# Patient Record
Sex: MALE | Race: WHITE
[De-identification: names, ages, dates, MRNs, and addresses within clinical notes are randomized per-mention and may not be internally consistent; named-entity substitution may affect disease eponyms.]

---

## 2017-01-10 ENCOUNTER — HOSPITAL ENCOUNTER (OUTPATIENT)
Dept: HOSPITAL 61 - KCIC | Age: 57
Discharge: HOME | End: 2017-01-10
Attending: NEUROLOGICAL SURGERY
Payer: OTHER GOVERNMENT

## 2017-01-10 DIAGNOSIS — M51.37: ICD-10-CM

## 2017-01-10 DIAGNOSIS — M47.896: ICD-10-CM

## 2017-01-10 DIAGNOSIS — M54.16: Primary | ICD-10-CM

## 2017-01-10 PROCEDURE — 72110 X-RAY EXAM L-2 SPINE 4/>VWS: CPT

## 2017-01-10 PROCEDURE — 72132 CT LUMBAR SPINE W/DYE: CPT

## 2017-01-10 PROCEDURE — 72265 MYELOGRAPHY L-S SPINE: CPT

## 2017-01-10 NOTE — [facility]
PROCEDURE 

CT lumbar spine exam 

 

HISTORY 

Lumbar radiculopathy, low back pain 

 

TECHNIQUE 

CT imaging was performed of the lumbar spine after injection for the 

lumbar myelogram. Multiplanar reconstruction images are submitted. 

Exposure: One or more of the following individualized dose reduction 

techniques were utilized for this exam: 1. Automated exposure control. 2. 

Adjustment of the mA and/or kV according to patient size. 3. Use of 

iterative reconstruction technique. 

 

COMPARISON 

MRI lumbar spine exam August 21, 2016 [facility] 

 

FINDINGS 

There is transitional anatomy of the lumbar spine. Based on the 

visualization of bilateral ribs at what will be considered T12, there is a

rudimentary intervertebral disc space at what is considered S1-S2 with the

most inferior fully formed intervertebral disc space considered L5-S1. 

There is advanced degenerative disc disease at L5-S1 and to lesser degree 

at L4-5, minimally at L3-4. Lumbar vertebral body stature is preserved. 

There is negligible posterior subluxation of L1 relative to L2. There is 

mild inferior lumbar levoscoliosis. Conus terminates at what is considered

L1-L2. Incidental note is made of circumaortic left renal vein. There are 

some small contrast opacified nerve root sleeve diverticula such as on the

right at S3. 

T12-L1: Spinal canal and neural foramina are adequate. 

L1-L2: Neural foramina and spinal canal are adequate. There is mild facet 

degenerative change. 

L2-3: There is mild to moderate facet degenerative change. There is 

minimal narrowing of the left neural foramen, right neural foramen 

adequate. Spinal canal is adequate. 

L3-4: There is mild to moderate facet degenerative change. There is 

negligible disc osteophyte complex and bulge. Spinal canal is adequate. 

Neural foramina are adequate. 

L4-L5: There is moderate facet degenerative change and minimal buckling of

the ligamentum flavum. There is minimal broad posterior disc osteophyte 

complex. There is mild narrowing of the right neural foramen with disc 

osteophyte complex near the extraforaminal right L4 nerve root. Left 

neural foramen is adequate. Minimal disc osteophyte complex is also near 

the extraforaminal left L4 nerve root without displacement. Spinal canal 

is overall adequate. 

L5-S1: There is minimal disc osteophyte complex. Spinal canal is overall 

adequate. There is mild to moderate facet degenerative change. There is 

moderate narrowing of the right neural foramen greater distally in part 

from disc osteophyte complex, mild-to-moderate narrowing on the left due 

to facet degenerative change and minimal disc osteophyte complex. 

S1-S2: Spinal canal and neural foramina are adequate. 

 

IMPRESSION 

1. There is transitional anatomy of the lumbar spine, rudimentary 

intervertebral disc space at what is considered S1-S2 with the most 

inferior fully formed intervertebral disc space considered L5-S1 for this 

report.

2. There is advanced degenerative disc disease at L5-S1 and to a somewhat 

lesser degree at L4-5, minimally at L3-4. There is spondylosis greatest at

the same levels.

3. There is no significant lumbar spinal stenosis.

4. There is mild to moderate neural foramina compromise bilaterally at 

L5-S1, to a lesser degree on the right at L4-5.

5. There is multilevel lumbar facet degenerative change.

 

 

 

Electronically signed by: Jae Turner MD (Major 10, 2017 16:05:32)

## 2017-01-10 NOTE — [facility]
PROCEDURE 

Lumbar spine radiographs 

 

HISTORY 

Lumbar radiculopathy, low back pain for 6 years 

 

COMPARISON 

None 

 

FINDINGS 

Five views of the lumbar spine to include neutral, flexion, and extension 

lateral radiographs are submitted. There is mild dextroscoliosis centered 

upon the superior lumbar spine. There apparently is transitional anatomy 

of the lumbar spine. There is a rudimentary intervertebral disc space at 

what is considered S1-S2. Most inferior fully formed intervertebral disc 

space is considered L5-S1. There is advanced degenerative disc disease at 

what is considered L5-S1 and to a lesser degree at L4-5. There is mild 

spondylosis of the lumbar spine. There is facet degenerative change 

greater inferiorly of the lumbar spine. Lumbar vertebral body stature is 

adequate. Vertebral body AP alignment is within normal limits, does not 

significantly change with flexion or extension. 

 

IMPRESSION 

1. There is transitional anatomy of the lumbar spine, most inferior fully 

formed intervertebral disc space considered L5-S1 with a rudimentary 

intervertebral disc space at S1-S2.

2. There is degenerative disc disease at what is considered L4-5 and 

L5-S1, mild multilevel spondylosis. There is multilevel lumbar facet 

degenerative change.

 

 

 

Electronically signed by: Jae Turner MD (Major 10, 2017 15:31:35)

## 2017-01-10 NOTE — [facility]
PROCEDURE 

Lumbar myelogram. 

 

HISTORY 

Lumbar radiculopathy, low back pain for 6 years 

 

TECHNIQUE 

Patient was informed of the risks to include pain, infection, bleeding, 

nerve root injury, seizures, allergic reaction. All questions were 

answered. Patient signed a written consent form for lumbar myelogram.The 

patient was placed in a prone oblique position on the fluoroscopy table. 

External skin site of the lower back was prepped and draped in the usual 

sterile fashion. Betadine was utilized for cleansing solution. 1 percent 

lidocaine was utilized for local anesthesia at the anticipated site of 

puncture of the right L3-4 interlaminar space. A guiding needle was 

advanced into the soft tissues. Through the guiding needle, a 25 Leah 

needle was advanced until return of cerebral spinal fluid.Fifteen cc 

[id number] injected during fluoroscopic visualization. Needles were 

removed. There were no immediate complications. Fluoroscopic spot images 

were acquired including standing images of the lumbar spine. The patient 

was transferred to CT suite for CT examination of the lumbar spine. 

Fluoroscopy time, fluoroscopy images: 

1 minutes 4 seconds, 11 images 

 

FINDINGS 

There apparently is transitional anatomy of the lumbar spine. There is a 

rudimentary intervertebral disc space at what is considered S1-S2 with 

most inferior fully formed intervertebral disc space considered L5-S1. 

There is fairly advanced degenerative disc disease at what is considered 

L5-S1, to a lesser degree at L4-5. There are anterior extradural defects 

at L4-5 and L5-S1. There is multilevel mild spondylosis of the mid to 

inferior lumbar spine. There was no evidence of myelographic block. There 

are small contrast opacified nerve root sleeve diverticula on the right at

what is considered S2 and S3. 

 

IMPRESSION 

There is transitional anatomy of the lumbar spine, rudimentary 

intervertebral disc space at what is considered S1-S2. There is fairly 

advanced degenerative disc disease at what is considered L5-S1 and to a 

lesser degree at L4-5, spondylosis of mid to inferior lumbar spine. There 

are anterior extradural defects at what is considered L4-5 and L5-S1. 

 

Electronically signed by: Jae Turner MD (Major 10, 2017 15:28:54)